# Patient Record
Sex: MALE | Race: WHITE | URBAN - METROPOLITAN AREA
[De-identification: names, ages, dates, MRNs, and addresses within clinical notes are randomized per-mention and may not be internally consistent; named-entity substitution may affect disease eponyms.]

---

## 2022-08-17 ENCOUNTER — OFFICE VISIT (OUTPATIENT)
Dept: ENT CLINIC | Age: 37
End: 2022-08-17
Payer: OTHER GOVERNMENT

## 2022-08-17 VITALS
SYSTOLIC BLOOD PRESSURE: 117 MMHG | HEART RATE: 74 BPM | OXYGEN SATURATION: 96 % | DIASTOLIC BLOOD PRESSURE: 73 MMHG | TEMPERATURE: 97.7 F

## 2022-08-17 DIAGNOSIS — Z72.0 TOBACCO ABUSE: Chronic | ICD-10-CM

## 2022-08-17 DIAGNOSIS — Z71.1 CONCERN ABOUT CANCER WITHOUT DIAGNOSIS: Chronic | ICD-10-CM

## 2022-08-17 DIAGNOSIS — R07.0 THROAT DISCOMFORT: Primary | Chronic | ICD-10-CM

## 2022-08-17 PROCEDURE — 99202 OFFICE O/P NEW SF 15 MIN: CPT | Performed by: OTOLARYNGOLOGY

## 2022-08-17 ASSESSMENT — ENCOUNTER SYMPTOMS
RHINORRHEA: 0
SORE THROAT: 0
SINUS PAIN: 0

## 2022-08-17 NOTE — PROGRESS NOTES
[facility]       NEW PATIENT VISIT      PCP:  None Provider      REFERRED BY:   self      CHIEF COMPLAINT  Chief Complaint   Patient presents with    Anxiety     About risk of cancer due to use of dip. HISTORY OF PRESENT ILLNESS       Ugo Ruelas is a 40 y.o. male who presented today for a throat problem, which \"sometimes feels funny. I dip and I'm concerned. \"  He denied any other throat symptoms. He has never smoked. He uses \"Grizzly wintergreen long cut\", once a day, puts under front lip in the middle. Leave in for about 30-45 minutes. He has not noticed and sores thickeing or white patches itn the are. 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 REVIEW OF SYSTEMS   Review of Systems   Constitutional:  Negative for chills, fever and unexpected weight change. HENT:  Negative for ear discharge, ear pain, rhinorrhea, sinus pain and sore throat. PAST MEDICAL HISTORY    No past medical history on file. No past surgical history on file. EXAMINATION    Vitals:    08/17/22 1013   BP: 117/73   Site: Left Upper Arm   Position: Sitting   Cuff Size: Large Adult   Pulse: 74   Temp: 97.7 °F (36.5 °C)   TempSrc: Temporal   SpO2: 96%     General:  WDWN, NAD, alert and oriented  Face: There was no swelling or lesions detected. Voice: Normal with no hoarseness or hot potato voice. Ears:  TMs and EACs appeared to be normal.       Nose: The nasal septum, turbinates, secretions, and mucosa appeared to be normal.   Sinuses:  Maxillary and frontal sinuses were nontender to palpation and percussion. Oral cavity:  I detected no mucosal lesions, masses, ulcerations, or leukoplakia in the oral cavity.   Mucosa, secretions, tongue, and gingiva appeared to be normal.   Oropharynx:  I detected no mucosal lesions, masses, ulcerations, or leukoplakia in the